# Patient Record
Sex: MALE | Race: WHITE | Employment: FULL TIME | URBAN - METROPOLITAN AREA
[De-identification: names, ages, dates, MRNs, and addresses within clinical notes are randomized per-mention and may not be internally consistent; named-entity substitution may affect disease eponyms.]

---

## 2018-04-28 ENCOUNTER — APPOINTMENT (OUTPATIENT)
Dept: GENERAL RADIOLOGY | Age: 43
End: 2018-04-28
Attending: PHYSICIAN ASSISTANT
Payer: COMMERCIAL

## 2018-04-28 ENCOUNTER — HOSPITAL ENCOUNTER (EMERGENCY)
Age: 43
Discharge: HOME | End: 2018-04-28
Attending: EMERGENCY MEDICINE
Payer: COMMERCIAL

## 2018-04-28 ENCOUNTER — APPOINTMENT (OUTPATIENT)
Dept: CT IMAGING | Age: 43
End: 2018-04-28
Attending: PHYSICIAN ASSISTANT
Payer: COMMERCIAL

## 2018-04-28 VITALS
TEMPERATURE: 98.2 F | RESPIRATION RATE: 20 BRPM | DIASTOLIC BLOOD PRESSURE: 99 MMHG | WEIGHT: 315 LBS | HEIGHT: 74 IN | SYSTOLIC BLOOD PRESSURE: 144 MMHG | BODY MASS INDEX: 40.43 KG/M2 | HEART RATE: 103 BPM

## 2018-04-28 DIAGNOSIS — S16.1XXA STRAIN OF NECK MUSCLE, INITIAL ENCOUNTER: ICD-10-CM

## 2018-04-28 DIAGNOSIS — S61.412D LACERATION OF LEFT HAND WITHOUT FOREIGN BODY, SUBSEQUENT ENCOUNTER: ICD-10-CM

## 2018-04-28 DIAGNOSIS — S00.03XA CONTUSION OF SCALP, INITIAL ENCOUNTER: Primary | ICD-10-CM

## 2018-04-28 DIAGNOSIS — S39.012A LUMBAR STRAIN, INITIAL ENCOUNTER: ICD-10-CM

## 2018-04-28 PROCEDURE — 90471 IMMUNIZATION ADMIN: CPT

## 2018-04-28 PROCEDURE — 74011250637 HC RX REV CODE- 250/637: Performed by: PHYSICIAN ASSISTANT

## 2018-04-28 PROCEDURE — 72070 X-RAY EXAM THORAC SPINE 2VWS: CPT

## 2018-04-28 PROCEDURE — 99283 EMERGENCY DEPT VISIT LOW MDM: CPT

## 2018-04-28 PROCEDURE — 72100 X-RAY EXAM L-S SPINE 2/3 VWS: CPT

## 2018-04-28 PROCEDURE — 74011250637 HC RX REV CODE- 250/637: Performed by: EMERGENCY MEDICINE

## 2018-04-28 PROCEDURE — 74011250636 HC RX REV CODE- 250/636: Performed by: EMERGENCY MEDICINE

## 2018-04-28 PROCEDURE — 70450 CT HEAD/BRAIN W/O DYE: CPT

## 2018-04-28 PROCEDURE — 90715 TDAP VACCINE 7 YRS/> IM: CPT | Performed by: PHYSICIAN ASSISTANT

## 2018-04-28 PROCEDURE — 72125 CT NECK SPINE W/O DYE: CPT

## 2018-04-28 PROCEDURE — 74011250636 HC RX REV CODE- 250/636: Performed by: PHYSICIAN ASSISTANT

## 2018-04-28 RX ORDER — OXYCODONE AND ACETAMINOPHEN 5; 325 MG/1; MG/1
1-2 TABLET ORAL
Qty: 12 TAB | Refills: 0 | Status: SHIPPED | OUTPATIENT
Start: 2018-04-28 | End: 2018-07-06 | Stop reason: ALTCHOICE

## 2018-04-28 RX ORDER — OXYCODONE AND ACETAMINOPHEN 5; 325 MG/1; MG/1
1 TABLET ORAL
Status: COMPLETED | OUTPATIENT
Start: 2018-04-28 | End: 2018-04-28

## 2018-04-28 RX ORDER — MORPHINE SULFATE 4 MG/ML
4 INJECTION INTRAVENOUS
Status: DISCONTINUED | OUTPATIENT
Start: 2018-04-28 | End: 2018-04-29 | Stop reason: HOSPADM

## 2018-04-28 RX ORDER — IBUPROFEN 600 MG/1
600 TABLET ORAL
Qty: 30 TAB | Refills: 0 | Status: SHIPPED | OUTPATIENT
Start: 2018-04-28

## 2018-04-28 RX ORDER — METHOCARBAMOL 500 MG/1
1000 TABLET, FILM COATED ORAL 4 TIMES DAILY
Status: DISCONTINUED | OUTPATIENT
Start: 2018-04-28 | End: 2018-04-29 | Stop reason: HOSPADM

## 2018-04-28 RX ORDER — CEPHALEXIN 250 MG/1
500 CAPSULE ORAL
Status: COMPLETED | OUTPATIENT
Start: 2018-04-28 | End: 2018-04-28

## 2018-04-28 RX ORDER — TADALAFIL 10 MG/1
10 TABLET ORAL
COMMUNITY
End: 2018-07-06 | Stop reason: ALTCHOICE

## 2018-04-28 RX ORDER — MORPHINE SULFATE 2 MG/ML
2 INJECTION, SOLUTION INTRAMUSCULAR; INTRAVENOUS ONCE
Status: DISCONTINUED | OUTPATIENT
Start: 2018-04-28 | End: 2018-04-29 | Stop reason: HOSPADM

## 2018-04-28 RX ADMIN — TETANUS TOXOID, REDUCED DIPHTHERIA TOXOID AND ACELLULAR PERTUSSIS VACCINE, ADSORBED 0.5 ML: 5; 2.5; 8; 8; 2.5 SUSPENSION INTRAMUSCULAR at 20:47

## 2018-04-28 RX ADMIN — CEPHALEXIN 500 MG: 250 CAPSULE ORAL at 20:55

## 2018-04-28 RX ADMIN — OXYCODONE HYDROCHLORIDE AND ACETAMINOPHEN 1 TABLET: 5; 325 TABLET ORAL at 18:59

## 2018-04-28 NOTE — LETTER
NOTIFICATION RETURN TO WORK / SCHOOL 
 
4/28/2018 8:24 PM 
 
Mr. Barbara Garza [address] [place] [id number] To Whom It May Concern: 
 
Barbara Garza is currently under the care of [id number] [place] [address] EMERGENCY DEPT. He will return to work/school on: 5/1/18 If there are questions or concerns please have the patient contact our office. Sincerely, 
 
 
Dr Anh Azevedo

## 2018-04-28 NOTE — ED NOTES
Pt. Is requesting his morphine, Pt was notified what the MD is suggesting about holding the medication at this time. RN notified and will continue to monitor.

## 2018-04-28 NOTE — ED PROVIDER NOTES
EMERGENCY DEPARTMENT HISTORY AND PHYSICAL EXAM    Date: 4/28/2018  Patient Name: Ashley Davis    History of Presenting Illness     Chief Complaint   Patient presents with   Ulysses  Fall    Neck Pain    Back Pain    Laceration         History Provided By: Patient and Patient's Wife    Chief Complaint: Fall, back pain, neck pain   Duration: 1 hour   Timing:  Acute  Location: Head, neck, back, left palm   Quality: Aching  Severity: Moderate  Modifying Factors: Movement makes it worse, nothing makes it better   Associated Symptoms: none       Additional History (Context): Ashley Davis is a 38 y.o. male with a history of ADHD who presents with a 1 hour history of fall off a 6 ft ladder while does yard work outside. He states it was an A ladder and he landed on his back in the center of it. Denies hitting his head or LOC. Denies loss of bowel or bladder. Reports tingling down the left arm. Last tetanus unknown. Pt denies any fevers or chills, headache, dizziness or light headedness, ENT issues, CP or discomfort, SOB, cough, n/v/d/c, abd pain, diaphoresis, melena/hematochezia, dysuria, hematuria, frequency, focal weakness/numbness/tingling, or rash. Patient has no other complaints at this time. PCP: None    Current Facility-Administered Medications   Medication Dose Route Frequency Provider Last Rate Last Dose    oxyCODONE-acetaminophen (PERCOCET) 5-325 mg per tablet 1 Tab  1 Tab Oral NOW [place], [place]         Current Outpatient Prescriptions   Medication Sig Dispense Refill    tadalafil (CIALIS) 10 mg tablet Take 10 mg by mouth.  testosterone cypionate (DEPOTESTOTERONE CYPIONATE) 200 mg/mL injection 1 mL by IntraMUSCular route once for 1 dose. Every 2 weeks 2 mL 0    testosterone cypionate (DEPO-TESTOSTERONE) 200 mg/mL injection 1 mL by IntraMUSCular route once for 1 dose.  Every 2 weeks 10 mL 0    Needle, Disp, 21 G 21 x 1 1/2 \" Ndle For testosterone injection 10 Each 0    Syringe, Disposable, 3 mL Syrg For use with testosterone injection 10 Syringe 0       Past History     Past Medical History:  Past Medical History:   Diagnosis Date    Psychiatric disorder     ADHD    Varicose veins        Past Surgical History:  History reviewed. No pertinent surgical history. Family History:  Family History   Problem Relation Age of Onset    Diabetes Maternal Grandmother     Cancer Other        Social History:  Social History   Substance Use Topics    Smoking status: Never Smoker    Smokeless tobacco: Never Used    Alcohol use No       Allergies:  No Known Allergies      Review of Systems   Review of Systems   Constitutional: Negative for chills and fever. HENT: Negative for congestion, rhinorrhea and sore throat. Respiratory: Negative for cough and shortness of breath. Cardiovascular: Negative for chest pain. Gastrointestinal: Negative for abdominal pain, blood in stool, constipation, diarrhea, nausea and vomiting. Genitourinary: Negative for dysuria, frequency and hematuria. Musculoskeletal: Positive for back pain, myalgias, neck pain and neck stiffness. Skin: Negative for rash and wound. Neurological: Negative for dizziness and headaches. All other systems reviewed and are negative. All Other Systems Negative  Physical Exam     Vitals:    04/28/18 1756   BP: (!) 144/99   Pulse: (!) 103   Resp: 20   Temp: 98.2 °F (36.8 °C)   Weight: 145.2 kg (320 lb)   Height: 6' 2\" (1.88 m)     Physical Exam   Constitutional: He is oriented to person, place, and time. He appears well-developed and well-nourished. No distress. HENT:   Head: Normocephalic and atraumatic. Eyes: Conjunctivae are normal.   Neck: Normal range of motion. Neck supple. Cardiovascular: Normal rate, regular rhythm and normal heart sounds. Pulmonary/Chest: Effort normal and breath sounds normal. No respiratory distress. He exhibits no tenderness. Abdominal: Soft. Bowel sounds are normal. He exhibits no distension.  There is no tenderness. There is no rebound and no guarding. Musculoskeletal: Normal range of motion. He exhibits no edema or deformity. Thoracic back: He exhibits tenderness. He exhibits normal range of motion, no bony tenderness, no swelling, no edema and no deformity. Lumbar back: He exhibits normal range of motion, no tenderness, no bony tenderness, no swelling and no deformity. Neurological: He is alert and oriented to person, place, and time. Skin: Skin is warm and dry. He is not diaphoretic. Psychiatric: He has a normal mood and affect. His behavior is normal.   Nursing note and vitals reviewed. Diagnostic Study Results     Labs -   No results found for this or any previous visit (from the past 12 hour(s)). Radiologic Studies -   CT HEAD WO CONT    (Results Pending)   CT SPINE CERV WO CONT    (Results Pending)     CT Results  (Last 48 hours)    None        CXR Results  (Last 48 hours)    None            Medical Decision Making   I am the first provider for this patient. I reviewed the vital signs, available nursing notes, past medical history, past surgical history, family history and social history. Vital Signs-Reviewed the patient's vital signs. Pulse Oximetry Analysis -  100 % RA    Records Reviewed: Nursing Notes and Old Medical Records    Procedures:   Procedures    Provider Notes (Medical Decision Making):     Differential: fracture, closed head injury, dislocation, laceration, abrasion, contusion     Plan: Will order Ct of head and neck, xray of thorax and lumbar, will irrigate and clean wound. Will update tetanus     7:24 PM : Pt care transferred to Dr. José Manuel Castellano   ,ED provider. History of patient complaint(s), available diagnostic reports and current treatment plan has been discussed thoroughly. Bedside rounding on patient occured : no .   Intended disposition of patient : Home   Pending diagnostics reports and/or labs (please list): Imaging       MED RECONCILIATION:  Current Facility-Administered Medications   Medication Dose Route Frequency    oxyCODONE-acetaminophen (PERCOCET) 5-325 mg per tablet 1 Tab  1 Tab Oral NOW     Current Outpatient Prescriptions   Medication Sig    tadalafil (CIALIS) 10 mg tablet Take 10 mg by mouth.  testosterone cypionate (DEPOTESTOTERONE CYPIONATE) 200 mg/mL injection 1 mL by IntraMUSCular route once for 1 dose. Every 2 weeks    testosterone cypionate (DEPO-TESTOSTERONE) 200 mg/mL injection 1 mL by IntraMUSCular route once for 1 dose. Every 2 weeks    Needle, Disp, 21 G 21 x 1 1/2 \" Ndle For testosterone injection    Syringe, Disposable, 3 mL Syrg For use with testosterone injection       Disposition:  Home     DISCHARGE NOTE:   Pt has been reexamined. Patient has no new complaints, changes, or physical findings. Care plan outlined and precautions discussed. Results of imaging  were reviewed with the patient. All medications were reviewed with the patient; will d/c home with pain meds . All of pt's questions and concerns were addressed. Patient was instructed and agrees to follow up with PCP , as well as to return to the ED upon further deterioration. Patient is ready to go home. Follow-up Information     Follow up With Details Comments [address] Call in 2 days For Follow Up 2019 Chriss Kussmaul  [address] [facility] [address]    [id number] [facility] EMERGENCY DEPT Go to As needed, If symptoms worsen [address] [id number]  [phone]          Discharge Medication List as of 4/28/2018  8:26 PM      CONTINUE these medications which have NOT CHANGED    Details   tadalafil (CIALIS) 10 mg tablet Take 10 mg by mouth., Historical Med      testosterone cypionate (DEPOTESTOTERONE CYPIONATE) 200 mg/mL injection 1 mL by IntraMUSCular route once for 1 dose.  Every 2 weeks, Print, Disp-2 mL, R-0      testosterone cypionate (DEPO-TESTOSTERONE) 200 mg/mL injection 1 mL by IntraMUSCular route once for 1 dose. Every 2 weeks, Print, Disp-10 mL, R-0      Needle, Disp, 21 G 21 x 1 1/2 \" Ndle For testosterone injectionPrint, Disp-10 Each, R-0      Syringe, Disposable, 3 mL Syrg For use with testosterone injectionNormal, Disp-10 Syringe, R-0             Diagnosis     Clinical Impression:   1. Contusion of scalp, initial encounter    2. Strain of neck muscle, initial encounter    3. Laceration of left hand without foreign body, subsequent encounter    4.  Lumbar strain, initial encounter

## 2018-04-28 NOTE — ED NOTES
7:51 PM :Pt care assumed from [facility] , ED provider. Pt complaint(s), current treatment plan, progression and available diagnostic results have been discussed thoroughly. Rounding occurred: yes  Intended Disposition: TBD   Pending diagnostic reports and/or labs (please list): Imaging results       8:22 PM Pt rechecked and has no point tenderness of the spine. Pt only complains of generalized tenderness. 8:23 PM I have assessed the patient and discussed his results and diagnosis. Pt is discharged is in stable condition. Patient will f/u with PCP. Patient is to return to emergency department if any new or worsening condition. Patient understands and verbalizes agreement with plan.

## 2018-04-28 NOTE — ED NOTES
Per Dr. Nathan Aaron, hold Morphine and Robaxin at this time due to already receiving percocet. Patients RN notified and will continue to monitor.

## 2018-04-29 NOTE — DISCHARGE INSTRUCTIONS
Contusion: Care Instructions  Your Care Instructions  Contusion is the medical term for a bruise. It is the result of a direct blow or an impact, such as a fall. Contusions are common sports injuries. Most people think of a bruise as a black-and-blue spot. This happens when small blood vessels get torn and leak blood under the skin. But bones, muscles, and organs can also get bruised. This may damage deep tissues but not cause a bruise you can see. The doctor will do a physical exam to find the location of your contusion. You may also have tests to make sure you do not have a more serious injury, such as a broken bone or nerve damage. These may include X-rays or other imaging tests like a CT scan or MRI. Deep-tissue contusions may cause pain and swelling. But if there is no serious damage, they will often get better in a few weeks with home treatment. The doctor has checked you carefully, but problems can develop later. If you notice any problems or new symptoms, get medical treatment right away. Follow-up care is a key part of your treatment and safety. Be sure to make and go to all appointments, and call your doctor if you are having problems. It's also a good idea to know your test results and keep a list of the medicines you take. How can you care for yourself at home? · Put ice or a cold pack on the sore area for 10 to 20 minutes at a time to stop swelling. Put a thin cloth between the ice pack and your skin. · Be safe with medicines. Read and follow all instructions on the label. ¨ If the doctor gave you a prescription medicine for pain, take it as prescribed. ¨ If you are not taking a prescription pain medicine, ask your doctor if you can take an over-the-counter medicine. · If you can, prop up the sore area on pillows as much as possible for the next few days. Try to keep the sore area above the level of your heart. When should you call for help?   Call your doctor now or seek immediate medical care if:  · Your pain gets worse. · You have new or worse swelling. · You have tingling, weakness, or numbness in the area near the contusion. · The area near the contusion is cold or pale. Watch closely for changes in your health, and be sure to contact your doctor if:  · You do not get better as expected. Where can you learn more? Go to [email or web address]  Enter [id number] in the search box to learn more about \"Contusion: Care Instructions. \"   © 2065-0315 [employer]. Care instructions adapted under license by [employer] (which disclaims liability or warranty for this information). This care instruction is for use with your licensed healthcare professional. If you have questions about a medical condition or this instruction, always ask your healthcare professional. [address] any warranty or liability for your use of this information. Content Version: 37.2.431116; Current as of: May 22, 2015             Neck Strain: Care Instructions  Your Care Instructions    You have strained the muscles and ligaments in your neck. A sudden, awkward movement can strain the neck. This often occurs with falls or car accidents or during certain sports. Everyday activities like working on a computer or sleeping can also cause neck strain if they force you to hold your neck in an awkward position for a long time. It is common for neck pain to get worse for a day or two after an injury, but it should start to feel better after that. You may have more pain and stiffness for several days before it gets better. This is expected. It may take a few weeks or longer for it to heal completely. Good home treatment can help you get better faster and avoid future neck problems. Follow-up care is a key part of your treatment and safety. Be sure to make and go to all appointments, and call your doctor if you are having problems.  It's also a good idea to know your test results and keep a list of the medicines you take. How can you care for yourself at home? · If you were given a neck brace (cervical collar) to limit neck motion, wear it as instructed for as many days as your doctor tells you to. Do not wear it longer than you were told to. Wearing a brace for too long can make neck stiffness worse and weaken the neck muscles. · You can try using heat or ice to see if it helps. ¨ Try using a heating pad on a low or medium setting for 15 to 20 minutes every 2 to 3 hours. Try a warm shower in place of one session with the heating pad. You can also buy single-use heat wraps that last up to 8 hours. ¨ You can also try an ice pack for 10 to 15 minutes every 2 to 3 hours. · Take pain medicines exactly as directed. ¨ If the doctor gave you a prescription medicine for pain, take it as prescribed. ¨ If you are not taking a prescription pain medicine, ask your doctor if you can take an over-the-counter medicine. · Gently rub the area to relieve pain and help with blood flow. Do not massage the area if it hurts to do so. · Do not do anything that makes the pain worse. Take it easy for a couple of days. You can do your usual activities if they do not hurt your neck or put it at risk for more stress or injury. · Try sleeping on a special neck pillow. Place it under your neck, not under your head. Placing a tightly rolled-up towel under your neck while you sleep will also work. If you use a neck pillow or rolled towel, do not use your regular pillow at the same time. · To prevent future neck pain, do exercises to stretch and strengthen your neck and back. Learn how to use good posture, safe lifting techniques, and proper body mechanics. When should you call for help? Call 911 anytime you think you may need emergency care. For example, call if:  ? · You are unable to move an arm or a leg at all.    ?Call your doctor now or seek immediate medical care if:  ? · You have new or worse symptoms in your arms, legs, chest, belly, or buttocks. Symptoms may include:  ¨ Numbness or tingling. ¨ Weakness. ¨ Pain. ? · You lose bladder or bowel control. ? Watch closely for changes in your health, and be sure to contact your doctor if:  ? · You are not getting better as expected. Where can you learn more? Go to http://mariam-kaitlynn.info/. Enter M253 in the search box to learn more about \"Neck Strain: Care Instructions. \"  Current as of: March 21, 2017  Content Version: 11.4  © 8710-8632 [employer]. Care instructions adapted under license by [employer] (which disclaims liability or warranty for this information). If you have questions about a medical condition or this instruction, always ask your healthcare professional. [address] any warranty or liability for your use of this information. Cuts: Care Instructions  Your Care Instructions  A cut can happen anywhere on your body. Stitches, staples, skin adhesives, or pieces of tape called Steri-Strips are sometimes used to keep the edges of a cut together and help it heal. Steri-Strips can be used by themselves or with stitches or staples. Sometimes cuts are left open. If the cut went deep and through the skin, the doctor may have closed the cut in two layers. A deeper layer of stitches brings the deep part of the cut together. These stitches will dissolve and don't need to be removed. The upper layer closure, which could be stitches, staples, Steri-Strips, or adhesive, is what you see on the cut. A cut is often covered by a bandage. The doctor has checked you carefully, but problems can develop later. If you notice any problems or new symptoms, get medical treatment right away. Follow-up care is a key part of your treatment and safety. Be sure to make and go to all appointments, and call your doctor if you are having problems.  It's also a good idea to know your test results and keep a list of the medicines you take. How can you care for yourself at home? If a cut is open or closed  · Prop up the sore area on a pillow anytime you sit or lie down during the next 3 days. Try to keep it above the level of your heart. This will help reduce swelling. · Keep the cut dry for the first 24 to 48 hours. After this, you can shower if your doctor okays it. Pat the cut dry. · Don't soak the cut, such as in a bathtub. Your doctor will tell you when it's safe to get the cut wet. · After the first 24 to 48 hours, clean the cut with soap and water 2 times a day unless your doctor gives you different instructions. ¨ Don't use hydrogen peroxide or alcohol, which can slow healing. ¨ You may cover the cut with a thin layer of petroleum jelly and a nonstick bandage. ¨ If the doctor put a bandage over the cut, put on a new bandage after cleaning the cut or if the bandage gets wet or dirty. · Avoid any activity that could cause your cut to reopen. · Be safe with medicines. Read and follow all instructions on the label. ¨ If the doctor gave you a prescription medicine for pain, take it as prescribed. ¨ If you are not taking a prescription pain medicine, ask your doctor if you can take an over-the-counter medicine. If the cut is closed with stitches, staples, or Steri-Strips  · Follow the above instructions for open or closed cuts. · Do not remove the stitches or staples on your own. Your doctor will tell you when to come back to have the stitches or staples removed. · Leave Steri-Strips on until they fall off. If the cut is closed with a skin adhesive  · Follow the above instructions for open or closed cuts. · Leave the skin adhesive on your skin until it falls off on its own. This may take 5 to 10 days. · Do not scratch, rub, or pick at the adhesive. · Do not put the sticky part of a bandage directly on the adhesive. · Do not put any kind of ointment, cream, or lotion over the area.  This can make the adhesive fall off too soon. Do not use hydrogen peroxide or alcohol, which can slow healing. When should you call for help? Call your doctor now or seek immediate medical care if:  ? · You have new pain, or your pain gets worse. ? · The skin near the cut is cold or pale or changes color. ? · You have tingling, weakness, or numbness near the cut.   ? · The cut starts to bleed, and blood soaks through the bandage. Oozing small amounts of blood is normal.   ? · You have trouble moving the area near the cut.   ? · You have symptoms of infection, such as:  ¨ Increased pain, swelling, warmth, or redness around the cut. ¨ Red streaks leading from the cut. ¨ Pus draining from the cut. ¨ A fever. ? Watch closely for changes in your health, and be sure to contact your doctor if:  ? · The cut reopens. ? · You do not get better as expected. Where can you learn more? Go to http://mariam-kaitlynn.info/. Enter M735 in the search box to learn more about \"Cuts: Care Instructions. \"  Current as of: March 20, 2017  Content Version: 11.4  © 1405-2636 [employer]. Care instructions adapted under license by [employer] (which disclaims liability or warranty for this information). If you have questions about a medical condition or this instruction, always ask your healthcare professional. [place] [address] any warranty or liability for your use of this information. Back Strain: Care Instructions  Your Care Instructions    Back strain happens when you overstretch, or pull, a muscle in your back. You may hurt your back in an accident or when you exercise or lift something. Most back pain will get better with rest and time. You can take care of yourself at home to help your back heal.  Follow-up care is a key part of your treatment and safety. Be sure to make and go to all appointments, and call your doctor if you are having problems.  It's also a good idea to know your test results and keep a list of the medicines you take. How can you care for yourself at home? · Try to stay as active as you can, but stop or reduce any activity that causes pain. · Put ice or a cold pack on the sore muscle for 10 to 20 minutes at a time to stop swelling. Try this every 1 to 2 hours for 3 days (when you are awake) or until the swelling goes down. Put a thin cloth between the ice pack and your skin. · After 2 or 3 days, apply a heating pad on low or a warm cloth to your back. Some doctors suggest that you go back and forth between hot and cold treatments. · Take pain medicines exactly as directed. ¨ If the doctor gave you a prescription medicine for pain, take it as prescribed. ¨ If you are not taking a prescription pain medicine, ask your doctor if you can take an over-the-counter medicine. · Try sleeping on your side with a pillow between your legs. Or put a pillow under your knees when you lie on your back. These measures can ease pain in your lower back. · Return to your usual level of activity slowly. When should you call for help? Call 911 anytime you think you may need emergency care. For example, call if:  ? · You are unable to move a leg at all. ?Call your doctor now or seek immediate medical care if:  ? · You have new or worse symptoms in your legs, belly, or buttocks. Symptoms may include:  ¨ Numbness or tingling. ¨ Weakness. ¨ Pain. ? · You lose bladder or bowel control. ? Watch closely for changes in your health, and be sure to contact your doctor if you are not getting better as expected. Where can you learn more? Go to http://mariam-kaitlynn.info/. Enter R603 in the search box to learn more about \"Back Strain: Care Instructions. \"  Current as of: March 21, 2017  Content Version: 11.4  © 5639-5035 [employer].  Care instructions adapted under license by [employer] (which disclaims liability or warranty for this information). If you have questions about a medical condition or this instruction, always ask your healthcare professional. [place] [address] any warranty or liability for your use of this information.

## 2018-04-29 NOTE — ED NOTES
Written and verbal discharge instructions given. Patient verbalizes understanding of same. Patient denies  further questions about treatment and discharge instructions. Left ED with patent airway and steady gait. Arm band removed shredded.  Patient left ED with 2 RX

## 2018-05-29 ENCOUNTER — OFFICE VISIT (OUTPATIENT)
Dept: VASCULAR SURGERY | Age: 43
End: 2018-05-29

## 2018-05-29 VITALS
HEART RATE: 86 BPM | SYSTOLIC BLOOD PRESSURE: 120 MMHG | BODY MASS INDEX: 40.43 KG/M2 | DIASTOLIC BLOOD PRESSURE: 76 MMHG | HEIGHT: 74 IN | WEIGHT: 315 LBS

## 2018-05-29 DIAGNOSIS — I83.029 VENOUS ULCER OF BOTH LOWER EXTREMITIES WITH VARICOSE VEINS (HCC): ICD-10-CM

## 2018-05-29 DIAGNOSIS — L97.929 VENOUS ULCER OF BOTH LOWER EXTREMITIES WITH VARICOSE VEINS (HCC): ICD-10-CM

## 2018-05-29 DIAGNOSIS — I83.019 VENOUS ULCER OF BOTH LOWER EXTREMITIES WITH VARICOSE VEINS (HCC): ICD-10-CM

## 2018-05-29 DIAGNOSIS — L97.919 VENOUS ULCER OF BOTH LOWER EXTREMITIES WITH VARICOSE VEINS (HCC): ICD-10-CM

## 2018-05-29 DIAGNOSIS — I83.893 VARICOSE VEINS OF BOTH LOWER EXTREMITIES WITH COMPLICATIONS: Primary | ICD-10-CM

## 2018-05-29 PROBLEM — E66.01 OBESITY, MORBID (HCC): Status: ACTIVE | Noted: 2018-05-29

## 2018-05-29 RX ORDER — ASPIRIN 325 MG
325 TABLET ORAL DAILY
COMMUNITY

## 2018-05-29 RX ORDER — DEXTROAMPHETAMINE SACCHARATE, AMPHETAMINE ASPARTATE, DEXTROAMPHETAMINE SULFATE AND AMPHETAMINE SULFATE 5; 5; 5; 5 MG/1; MG/1; MG/1; MG/1
20 TABLET ORAL 3 TIMES DAILY
COMMUNITY

## 2018-05-29 NOTE — PROGRESS NOTES
Barbie Arora    Chief Complaint   Patient presents with    Leg Pain       History and Physical    Mr Brent Hearn is here at the request of his PCP  He has long standing history of venous insufficiency/reflux  He was in to see his PCP recently in follow up to ED visit after a fall off of a ladder  He is doing ok  But it was recognized about his leg edema, varicose veins, and ankle ulcers, that he was again persuaded to get re-established with vascular  He acknowledges problems since adolescence  He has had prior GSV ablations in 2012 at another facility  He is very compliant wearing knee high 20-30mmHg compression for years  The ladder fall did not cause any ulcer as these existed previously  He has a pin point opening on the left medial ankle and about 2-3mm opening right medial ankle and can see scarring from prior ulcers  He is doing no specific care other then keeping clean and dry    He is a pharmD and works at [employer]  He denies work up previously for any central vein or may thurner condition. But recalls some findings on leg ultrasound that upon discussion it may have perhaps been some deep vein reflux? Past Medical History:   Diagnosis Date    Psychiatric disorder     ADHD    Varicose veins      Patient Active Problem List   Diagnosis Code    Hypogonadism male E29.1    Erectile dysfunction N52.9    Obesity, morbid ([facility] [address] E66.01     History reviewed. No pertinent surgical history. Current Outpatient Prescriptions   Medication Sig Dispense Refill    dextroamphetamine-amphetamine (ADDERALL) 20 mg tablet Take 20 mg by mouth three (3) times daily.  aspirin (ASPIRIN) 325 mg tablet Take 325 mg by mouth daily.  ibuprofen (MOTRIN) 600 mg tablet Take 1 Tab by mouth every six (6) hours as needed for Pain.  30 Tab 0    Needle, Disp, 21 G 21 x 1 1/2 \" Ndle For testosterone injection 10 Each 0    Syringe, Disposable, 3 mL Syrg For use with testosterone injection 10 Syringe 0    tadalafil (CIALIS) 10 mg tablet Take 10 mg by mouth.  oxyCODONE-acetaminophen (PERCOCET) 5-325 mg per tablet Take 1-2 Tabs by mouth every six (6) hours as needed for Pain. Max Daily Amount: 8 Tabs. 12 Tab 0    testosterone cypionate (DEPOTESTOTERONE CYPIONATE) 200 mg/mL injection 1 mL by IntraMUSCular route once for 1 dose. Every 2 weeks 2 mL 0    testosterone cypionate (DEPO-TESTOSTERONE) 200 mg/mL injection 1 mL by IntraMUSCular route once for 1 dose. Every 2 weeks 10 mL 0     No Known Allergies  Social History     Social History    Marital status:      Spouse name: N/A    Number of children: N/A    Years of education: N/A     Occupational History    Not on file. Social History Main Topics    Smoking status: Never Smoker    Smokeless tobacco: Never Used    Alcohol use No    Drug use: No    Sexual activity: Yes     Other Topics Concern    Not on file     Social History Narrative      Family History   Problem Relation Age of Onset    Diabetes Maternal Grandmother     Cancer Other        Review of Systems    10 point ROS negative unless stated in HPI above    Physical Exam:    Visit Vitals    /76 (BP 1 Location: Left arm, BP Patient Position: Sitting)    Pulse 86    Ht 6' 2\" (1.88 m)    Wt 320 lb (145.2 kg)    BMI 41.09 kg/m2      General:  Alert, cooperative, no distress. Head:  Normocephalic, without obvious abnormality, atraumatic. Eyes:    Conjunctivae/corneas clear. Pupils equal, round, reactive to light. Extraocular movements intact. Extremities: Patient explains varicose veins in upper thighs. No remarkable or prominent veins distally. Trace edema   Pulses: Easily palpable with no signs of arterial insufficiency   Skin: As described in HPI above regarding the medial ankles           Impression and Plan:  1. Varicose veins of both lower extremities with complications    2.  Venous ulcer of both lower extremities with varicose veins ([address]      Orders Placed This Encounter    DUPLEX LOWER EXT VENOUS BILAT AMB (Reflux)    dextroamphetamine-amphetamine (ADDERALL) 20 mg tablet    aspirin (ASPIRIN) 325 mg tablet     Patient is well versed in the venous disease given his long standing history and his medical background  With development of new varicose veins since original ablation procedures, I suggested new evaluation with reflux study  It may go beyond leg evaluation and a central venogram considered, which I did explain  He is agreeable to complete the reflux study and with the other considerations I will then have him follow up with dr Yesi Oden after the study     Will ERIKA Higuera    Portions of this note have been created using voice recognition software.

## 2018-06-20 ENCOUNTER — APPOINTMENT (OUTPATIENT)
Dept: NON INVASIVE DIAGNOSTICS | Age: 43
End: 2018-06-20
Attending: PHYSICIAN ASSISTANT
Payer: COMMERCIAL

## 2018-06-20 ENCOUNTER — HOSPITAL ENCOUNTER (OUTPATIENT)
Dept: NON INVASIVE DIAGNOSTICS | Age: 43
Discharge: HOME | End: 2018-06-20
Attending: PHYSICIAN ASSISTANT
Payer: COMMERCIAL

## 2018-06-20 VITALS — HEIGHT: 74 IN | WEIGHT: 315 LBS | BODY MASS INDEX: 40.43 KG/M2

## 2018-06-20 DIAGNOSIS — R94.31 ABNORMAL ELECTROCARDIOGRAM: ICD-10-CM

## 2018-06-20 LAB
NUC REST EJECTION FRACTION: 45 %
STRESS BASELINE HR: 60 BPM
STRESS BASELINE SYS BP: NORMAL MMHG
STRESS ESTIMATED WORKLOAD: 1.6 METS
STRESS EXERCISE DUR MIN: NORMAL
STRESS PEAK DIAS BP: 80 MMHG
STRESS PEAK SYS BP: 130 MMHG
STRESS POST PEAK HR: 117 BPM
STRESS ST DEPRESSION: 0 MM
STRESS ST ELEVATION: 0 MM
STRESS TARGET HR: 152 BPM
TID: 1.12

## 2018-06-20 PROCEDURE — A9500 TC99M SESTAMIBI: HCPCS

## 2018-06-20 PROCEDURE — 74011250636 HC RX REV CODE- 250/636

## 2018-06-20 RX ORDER — SODIUM CHLORIDE 9 MG/ML
250 INJECTION, SOLUTION INTRAVENOUS ONCE
Status: DISPENSED | OUTPATIENT
Start: 2018-06-20 | End: 2018-06-20

## 2018-06-20 RX ADMIN — REGADENOSON 0.4 MG: 0.08 INJECTION, SOLUTION INTRAVENOUS at 09:10

## 2018-06-20 NOTE — PROGRESS NOTES
Patient was given 10.3 mCi of Sestamibi for the Resting pictures. Patient received 0.4 mg of Lexiscan for the exercise portion of the Stress test. Patient was then given 33 mCi of Sestamibi for the Stress pictures. Armband was removed and disposed of before the patient left.

## 2018-07-06 ENCOUNTER — OFFICE VISIT (OUTPATIENT)
Dept: CARDIOLOGY CLINIC | Age: 43
End: 2018-07-06

## 2018-07-06 VITALS
DIASTOLIC BLOOD PRESSURE: 90 MMHG | BODY MASS INDEX: 39.27 KG/M2 | WEIGHT: 306 LBS | HEART RATE: 75 BPM | OXYGEN SATURATION: 98 % | SYSTOLIC BLOOD PRESSURE: 130 MMHG | HEIGHT: 74 IN

## 2018-07-06 DIAGNOSIS — I87.2 VENOUS INSUFFICIENCY OF BOTH LOWER EXTREMITIES: ICD-10-CM

## 2018-07-06 DIAGNOSIS — R93.1 ABNORMAL NUCLEAR CARDIAC IMAGING TEST: Primary | ICD-10-CM

## 2018-07-06 DIAGNOSIS — E66.9 OBESITY (BMI 35.0-39.9 WITHOUT COMORBIDITY): ICD-10-CM

## 2018-07-06 DIAGNOSIS — R94.31 ABNORMAL EKG: ICD-10-CM

## 2018-07-06 RX ORDER — DEXTROAMPHETAMINE SACCHARATE, AMPHETAMINE ASPARTATE MONOHYDRATE, DEXTROAMPHETAMINE SULFATE AND AMPHETAMINE SULFATE 7.5; 7.5; 7.5; 7.5 MG/1; MG/1; MG/1; MG/1
30 CAPSULE, EXTENDED RELEASE ORAL
COMMUNITY

## 2018-07-06 RX ORDER — SILDENAFIL 25 MG/1
25 TABLET, FILM COATED ORAL SEE ADMIN INSTRUCTIONS
COMMUNITY

## 2018-07-06 NOTE — PROGRESS NOTES
1. Have you been to the ER, urgent care clinic since your last visit? Hospitalized since your last visit? No     2. Have you seen or consulted any other health care providers outside of the [address] [place] [address] since your last visit? Include any pap smears or colon screening.  No

## 2018-07-06 NOTE — MR AVS SNAPSHOT
[address] [place] [id number] [phone] Patient: Carloz Dan MRN: [id number] MVI:[id number] Visit Information Date & Time Provider Department Dept. Phone Encounter #  
 7/6/2018  1:00 PM Basil Pruitt MD [facility] [id number] Your Appointments 7/16/2018  1:30 PM  
Follow Up with Vinicius Zendejas MD  
[address] [place] [address] and [facility] Appt Note: FOLLOW UP AFTER STUDIES  
 [address] [place] [address] 939 [address] [facility] [address]  
[phone] 2300 [facility] 200 [facility] [address] Upcoming Health Maintenance Date Due Influenza Age 5 to Adult 8/1/2018 DTaP/Tdap/Td series (2 - Td) 4/28/2028 Allergies as of 7/6/2018  Review Complete On: 5/29/2018 By: Christina Ortega LPN No Known Allergies Current Immunizations  Never Reviewed Name Date Tdap 4/28/2018  8:47 PM  
  
 Not reviewed this visit You Were Diagnosed With   
  
 Codes Comments Abnormal nuclear cardiac imaging test    -  Primary ICD-10-CM: R93.1 ICD-9-CM: 794.39 Abnormal EKG     ICD-10-CM: R94.31 
ICD-9-CM: 794.31 Vitals BP Pulse Height(growth percentile) Weight(growth percentile) SpO2 BMI  
 130/90 75 6' 2\" (1.88 m) 306 lb (138.8 kg) 98% 39.29 kg/m2 Smoking Status Never Smoker Vitals History BMI and BSA Data Body Mass Index Body Surface Area  
 39.29 kg/m 2 2.69 m 2 Preferred Pharmacy Pharmacy Name Phone [facility]   [phone] Your Updated Medication List  
  
   
This list is accurate as of 7/6/18  1:49 PM.  Always use your most recent med list.  
  
  
  
  
 * ADDERALL 20 mg tablet Generic drug:  dextroamphetamine-amphetamine Take 20 mg by mouth three (3) times daily. * ADDERALL XR 30 mg XR capsule Generic drug:  amphetamine-dextroamphetamine XR Take 30 mg by mouth every morning. aspirin 325 mg tablet Commonly known as:  ASPIRIN Take 325 mg by mouth daily. ibuprofen 600 mg tablet Commonly known as:  MOTRIN Take 1 Tab by mouth every six (6) hours as needed for Pain. * testosterone cypionate 200 mg/mL injection Commonly known as:  DEPO-TESTOSTERONE  
1 mL by IntraMUSCular route once for 1 dose. Every 2 weeks * testosterone cypionate 200 mg/mL injection Commonly known as:  DEPOTESTOTERONE CYPIONATE 1 mL by IntraMUSCular route once for 1 dose. Every 2 weeks VIAGRA 25 mg tablet Generic drug:  sildenafil citrate Take 25 mg by mouth as needed. * Notice: This list has 4 medication(s) that are the same as other medications prescribed for you. Read the directions carefully, and ask your doctor or other care provider to review them with you. We Performed the Following AMB POC EKG ROUTINE W/ 12 LEADS, INTER & REP [95064 CPT(R)] To-Do List   
 07/06/2018 Echocardiography:  ECHO ADULT COMPLETE Introducing [facility]! [facility] introduces [employer] patient portal. Now you can access parts of your medical record, email your doctor's office, and request medication refills online. 1. In your internet browser, go to [email or web address] 2. Click on the First Time User? Click Here link in the Sign In box. You will see the New Member Sign Up page. 3. Enter your [employer] Access Code exactly as it appears below. You will not need to use this code after youve completed the sign-up process. If you do not sign up before the expiration date, you must request a new code. · [employer] Access Code: [email or web address] Expires: 7/27/2018  5:59 PM 
 
4.  Enter the last four digits of your Social Security Number (xxxx) and Date of Birth (mm/dd/yyyy) as indicated and click Submit. You will be taken to the next sign-up page. 5. Create a LinkedIn ID. This will be your LinkedIn login ID and cannot be changed, so think of one that is secure and easy to remember. 6. Create a LinkedIn password. You can change your password at any time. 7. Enter your Password Reset Question and Answer. This can be used at a later time if you forget your password. 8. Enter your e-mail address. You will receive e-mail notification when new information is available in [address] 9. Click Sign Up. You can now view and download portions of your medical record. 10. Click the Download Summary menu link to download a portable copy of your medical information. If you have questions, please visit the Frequently Asked Questions section of the LinkedIn website. Remember, [employer] is NOT to be used for urgent needs. For medical emergencies, dial 911. Now available from your iPhone and Android! Please provide this summary of care documentation to your next provider. Your primary care clinician is listed as Saundra Mccartney. If you have any questions after today's visit, please call [phone].

## 2018-07-06 NOTE — PROGRESS NOTES
HISTORY OF PRESENT ILLNESS  Claudetta Nest is a 41 y.o. male. HPI    Patient presents for a new office visit. He was referred by his PCP for evaluation of abnormal EKG and an abnormal pharmacologic nuclear stress test.  He does not have a prior cardiac history. He does have history of significant venous reflux disease in both legs status post multiple ablation procedures in the past.  He was found to have poor R-wave progression and a prior EKG with borderline inferior Q waves and as result underwent a pharmacologic nuclear stress test in June 2018 which showed fairly normal perfusion without evidence of ischemia or infarction, however, his ejection fraction was mildly depressed with EF calculated at 45%. His left ventricle also appeared mildly dilated. Patient denies ever having any chest pain, shortness of breath, orthopnea or PND. He does have chronic leg swelling which is unchanged. He has not had any major change in his activity level over the past 6 months. Past Medical History:   Diagnosis Date    Psychiatric disorder     ADHD    Varicose veins      Current Outpatient Prescriptions   Medication Sig Dispense Refill    amphetamine-dextroamphetamine XR (ADDERALL XR) 30 mg XR capsule Take 30 mg by mouth every morning.  sildenafil citrate (VIAGRA) 25 mg tablet Take 25 mg by mouth as needed.  dextroamphetamine-amphetamine (ADDERALL) 20 mg tablet Take 20 mg by mouth three (3) times daily.  aspirin (ASPIRIN) 325 mg tablet Take 325 mg by mouth daily.  ibuprofen (MOTRIN) 600 mg tablet Take 1 Tab by mouth every six (6) hours as needed for Pain. 30 Tab 0    testosterone cypionate (DEPOTESTOTERONE CYPIONATE) 200 mg/mL injection 1 mL by IntraMUSCular route once for 1 dose. Every 2 weeks 2 mL 0    testosterone cypionate (DEPO-TESTOSTERONE) 200 mg/mL injection 1 mL by IntraMUSCular route once for 1 dose.  Every 2 weeks 10 mL 0     No Known Allergies     Social History   Substance Use Topics    Smoking status: Never Smoker    Smokeless tobacco: Never Used    Alcohol use No     Family History   Problem Relation Age of Onset    Diabetes Maternal Grandmother     Cancer Other          Review of Systems   Constitutional: Negative for chills, fever and weight loss. HENT: Negative for nosebleeds. Eyes: Negative for blurred vision and double vision. Respiratory: Negative for cough, shortness of breath and wheezing. Cardiovascular: Positive for leg swelling. Negative for chest pain, palpitations, orthopnea, claudication and PND. Gastrointestinal: Negative for abdominal pain, heartburn, nausea and vomiting. Genitourinary: Negative for dysuria and hematuria. Musculoskeletal: Negative for falls and myalgias. Skin: Negative for rash. Neurological: Negative for dizziness, focal weakness and headaches. Endo/Heme/Allergies: Does not bruise/bleed easily. Psychiatric/Behavioral: Negative for substance abuse. Visit Vitals    /90    Pulse 75    Ht 6' 2\" (1.88 m)    Wt 138.8 kg (306 lb)    SpO2 98%    BMI 39.29 kg/m2       Physical Exam   Constitutional: He is oriented to person, place, and time. He appears well-developed and well-nourished. HENT:   Head: Normocephalic and atraumatic. Eyes: Conjunctivae are normal.   Neck: Neck supple. No JVD present. Carotid bruit is not present. Cardiovascular: Normal rate, regular rhythm, S1 normal, S2 normal and normal pulses. Exam reveals distant heart sounds. Exam reveals no gallop and no S3. No murmur heard. Pulmonary/Chest: Breath sounds normal. He has no wheezes. He has no rales. Abdominal: Soft. Bowel sounds are normal. There is no tenderness. Musculoskeletal: He exhibits no edema, tenderness or deformity. Bilateral varicose veins lower extremities   Neurological: He is alert and oriented to person, place, and time. Skin: Skin is warm and dry.      EKG: Normal sinus rhythm, normal axis, poor R-wave progression, cannot exclude prior anterior infarct, low voltage in the precordial leads, no ST-T wave changes concerning for ischemia. No previous EKG for comparison. ASSESSMENT and PLAN  Encounter Diagnoses   Name Primary?  Abnormal nuclear cardiac imaging test Yes    Abnormal EKG     Obesity (BMI 35.0-39.9 without comorbidity)     Venous insufficiency of both lower extremities      Abnormal nuclear stress test.  Patient stress test was abnormal primarily due to a mildly depressed LV systolic function, EF 09%. However with his body habitus, this can also be due to an inaccurate calculation due to poor delineation of the myocardial borders. He did not have any obvious perfusion abnormalities concerning for ischemia or prior infarct. I have recommended an echocardiogram to more accurately define his LV function. Abnormal EKG. Patient does have poor R-wave progression on his 12-lead electrocardiogram.  This is often seen with his body habitus especially with low voltage. An echocardiogram will be arranged as described above. Obesity. Patient reports that his weight has been fairly stable over the past 10 years. He is encouraged to try lose weight with lifestyle modification. As long as his echocardiogram is unremarkable, patient can follow-up as needed.

## 2018-07-16 ENCOUNTER — OFFICE VISIT (OUTPATIENT)
Dept: VASCULAR SURGERY | Age: 43
End: 2018-07-16

## 2018-07-16 VITALS
WEIGHT: 306 LBS | BODY MASS INDEX: 39.27 KG/M2 | HEART RATE: 72 BPM | RESPIRATION RATE: 16 BRPM | DIASTOLIC BLOOD PRESSURE: 74 MMHG | SYSTOLIC BLOOD PRESSURE: 110 MMHG | HEIGHT: 74 IN

## 2018-07-16 DIAGNOSIS — L97.311 VENOUS STASIS ULCER OF RIGHT ANKLE LIMITED TO BREAKDOWN OF SKIN WITH VARICOSE VEINS (HCC): ICD-10-CM

## 2018-07-16 DIAGNOSIS — I87.303 CHRONIC VENOUS HYPERTENSION INVOLVING BOTH SIDES: ICD-10-CM

## 2018-07-16 DIAGNOSIS — I83.013 VENOUS STASIS ULCER OF RIGHT ANKLE LIMITED TO BREAKDOWN OF SKIN WITH VARICOSE VEINS (HCC): ICD-10-CM

## 2018-07-16 DIAGNOSIS — I87.2 VENOUS INSUFFICIENCY OF BOTH LOWER EXTREMITIES: Primary | ICD-10-CM

## 2018-07-16 RX ORDER — SILDENAFIL CITRATE 20 MG/1
20 TABLET ORAL 3 TIMES DAILY
COMMUNITY

## 2018-07-16 NOTE — PROGRESS NOTES
William Yoder    Chief Complaint   Patient presents with   [facility] [place] Varicose Veins     Follow up studies       History and Physical    William Yoder is a 41 y.o. male with known class VI chronic venous insufficiency of the right lower extremity in class V on the left. Patient continues to have venous claudication of bilateral lower extremities as well as edema. He has pain along his varicose vein sites. He does wear his compression socks appropriately and adheres to medical therapy quite well. Unfortunately this is starting to get to a point where he is having a lot of difficulty performing his job at work. And has trouble with standing for long periods of time as well as even sitting for long periods of time. Is getting more more difficult for him to perform his duties at his work. No fevers or chills or shortness of breath at this current time. His wound on the right medial ankle seems to be slowly improving. Past Medical History:   Diagnosis Date    Psychiatric disorder     ADHD    Varicose veins      History reviewed. No pertinent surgical history. Patient Active Problem List   Diagnosis Code    Hypogonadism male E29.1    Erectile dysfunction N52.9    Obesity, morbid ([facility] [address] E66.01    Venous insufficiency of both lower extremities I87.2    Chronic venous hypertension involving both sides I87.303    Venous stasis ulcer of right ankle limited to breakdown of skin with varicose veins ([facility]) I83.013, L97.311     Current Outpatient Prescriptions   Medication Sig Dispense Refill    sildenafil, antihypertensive, (REVATIO) 20 mg tablet Take 20 mg by mouth three (3) times daily.  amphetamine-dextroamphetamine XR (ADDERALL XR) 30 mg XR capsule Take 30 mg by mouth every morning.  dextroamphetamine-amphetamine (ADDERALL) 20 mg tablet Take 20 mg by mouth three (3) times daily.  aspirin (ASPIRIN) 325 mg tablet Take 325 mg by mouth daily.       ibuprofen (MOTRIN) 600 mg tablet Take 1 Tab by mouth every six (6) hours as needed for Pain. 30 Tab 0    sildenafil citrate (VIAGRA) 25 mg tablet Take 25 mg by mouth See Admin Instructions.  testosterone cypionate (DEPOTESTOTERONE CYPIONATE) 200 mg/mL injection 1 mL by IntraMUSCular route once for 1 dose. Every 2 weeks 2 mL 0    testosterone cypionate (DEPO-TESTOSTERONE) 200 mg/mL injection 1 mL by IntraMUSCular route once for 1 dose. Every 2 weeks 10 mL 0     No Known Allergies  Social History     Social History    Marital status:      Spouse name: N/A    Number of children: N/A    Years of education: N/A     Occupational History    Not on file. Social History Main Topics    Smoking status: Never Smoker    Smokeless tobacco: Never Used    Alcohol use No    Drug use: No    Sexual activity: Yes     Other Topics Concern    Not on file     Social History Narrative      Family History   Problem Relation Age of Onset    Diabetes Maternal Grandmother     Cancer Other        Physical Exam:    Visit Vitals    /74 (BP 1 Location: Left arm, BP Patient Position: Sitting)    Pulse 72    Resp 16    Ht 6' 2\" (1.88 m)    Wt 306 lb (138.8 kg)    BMI 39.29 kg/m2      General: Well-appearing male in no acute distress  HEENT: EOMI no scleral icterus is noted  Pulmonary: No increased work of breathing is noted  Extremities: Warm and perfused bilaterally patient does have large varicosities of bilateral lower extremities. He also on the right medial malleolus has a small breakdown of skin ulceration pinpoint. He also has numerous healed ulcerations along the left medial malleolus as well as hemosiderin and lipodermatosclerosis of bilateral ankles. Neuro: Cranial nerves II through XII grossly intact    Impression and Plan:  Noemi Acuna is a 41 y.o. male with class VI chronic venous insufficiency of the right lower extremity with active ulceration and class V on the left lower extremity given healed ulceration.   I have recommended moving forward with central venography with IVUS imaging. Especially given the fact that his ultrasound imaging shows lateral thigh varicose veins. Depending on what the central venography performs and what we may need to fix and repair will depend on further mechanical phlebectomy or sclerotherapy that would be required of his lower extremities. Everything will be highly dependent as to how well he does after this central venogram and how he feels. We reviewed the plan with the patient and the patient understands. We also gave the patient appropriate instructions on their disease process and when to call back. Follow-up Disposition: Not on [address] [place] [address], MD    PLEASE NOTE:  This document has been produced using voice recognition software. Unrecognized errors in transcription may be present.

## 2018-07-16 NOTE — PROGRESS NOTES
1. Have you been to an emergency room or urgent care clinic since your last visit?  no    Hospitalized since your last visit? If yes, where, when, and reason for visit? no  2. Have you seen or consulted any other health care providers outside of the [facility] since your last visit including any procedures, health maintenance items.  If yes, where, when and reason for visit? no

## 2018-07-17 ENCOUNTER — TELEPHONE (OUTPATIENT)
Dept: VASCULAR SURGERY | Age: 43
End: 2018-07-17

## 2018-07-17 DIAGNOSIS — L97.311 VENOUS STASIS ULCER OF RIGHT ANKLE LIMITED TO BREAKDOWN OF SKIN WITH VARICOSE VEINS (HCC): ICD-10-CM

## 2018-07-17 DIAGNOSIS — I83.013 VENOUS STASIS ULCER OF RIGHT ANKLE LIMITED TO BREAKDOWN OF SKIN WITH VARICOSE VEINS (HCC): ICD-10-CM

## 2018-07-17 DIAGNOSIS — I87.2 VENOUS INSUFFICIENCY OF BOTH LOWER EXTREMITIES: Primary | ICD-10-CM

## 2018-07-17 NOTE — TELEPHONE ENCOUNTER
Patients wife left message that they have decided to not have the surgery and to go with the other plan to have CT scan. I have canceled the surgery.

## 2018-07-25 ENCOUNTER — HOSPITAL ENCOUNTER (OUTPATIENT)
Dept: CT IMAGING | Age: 43
Discharge: HOME | End: 2018-07-25
Attending: PHYSICIAN ASSISTANT
Payer: COMMERCIAL

## 2018-07-25 DIAGNOSIS — I83.013 VENOUS STASIS ULCER OF RIGHT ANKLE LIMITED TO BREAKDOWN OF SKIN WITH VARICOSE VEINS (HCC): ICD-10-CM

## 2018-07-25 DIAGNOSIS — I87.2 VENOUS INSUFFICIENCY OF BOTH LOWER EXTREMITIES: ICD-10-CM

## 2018-07-25 DIAGNOSIS — L97.311 VENOUS STASIS ULCER OF RIGHT ANKLE LIMITED TO BREAKDOWN OF SKIN WITH VARICOSE VEINS (HCC): ICD-10-CM

## 2018-07-25 PROCEDURE — 82565 ASSAY OF CREATININE: CPT

## 2018-07-25 PROCEDURE — 74174 CTA ABD&PLVS W/CONTRAST: CPT

## 2018-07-25 PROCEDURE — 74011636320 HC RX REV CODE- 636/320: Performed by: PHYSICIAN ASSISTANT

## 2018-07-25 RX ADMIN — IOPAMIDOL 100 ML: 755 INJECTION, SOLUTION INTRAVENOUS at 21:00

## 2018-07-26 LAB — CREAT UR-MCNC: 1.2 MG/DL (ref 0.6–1.3)

## 2018-08-10 ENCOUNTER — HOSPITAL ENCOUNTER (OUTPATIENT)
Dept: NON INVASIVE DIAGNOSTICS | Age: 43
Discharge: HOME | End: 2018-08-10
Attending: INTERNAL MEDICINE
Payer: COMMERCIAL

## 2018-08-10 VITALS
DIASTOLIC BLOOD PRESSURE: 90 MMHG | BODY MASS INDEX: 39.27 KG/M2 | SYSTOLIC BLOOD PRESSURE: 130 MMHG | HEIGHT: 74 IN | WEIGHT: 306 LBS

## 2018-08-10 DIAGNOSIS — R93.1 ABNORMAL NUCLEAR CARDIAC IMAGING TEST: ICD-10-CM

## 2018-08-10 DIAGNOSIS — R94.31 ABNORMAL EKG: ICD-10-CM

## 2018-08-10 LAB
ECHO AO ROOT DIAM: 4.25 CM
ECHO IVC SNIFF: 2.43 CM
ECHO LA VOL BP: 79.71 ML (ref 18–58)
ECHO LA VOL/BSA BIPLANE: 30.62 ML/M2
ECHO LV INTERNAL DIMENSION DIASTOLIC: 5.48 CM (ref 4.2–5.9)
ECHO LV INTERNAL DIMENSION SYSTOLIC: 2.82 CM
ECHO LV IVSD: 1.18 CM (ref 0.6–1)
ECHO LV MASS 2D: 332.4 G (ref 88–224)
ECHO LV MASS INDEX 2D: 127.7 G/M2
ECHO LV POSTERIOR WALL DIASTOLIC: 1.26 CM (ref 0.6–1)
ECHO LVOT DIAM: 2.45 CM
ECHO LVOT PEAK GRADIENT: 3.4 MMHG
ECHO LVOT PEAK VELOCITY: 91.65 CM/S
ECHO LVOT VTI: 20.96 CM
ECHO MV A VELOCITY: 73.3 CM/S
ECHO MV E DECELERATION TIME (DT): 167.9 MS
ECHO MV E VELOCITY: 1.05 CM/S
ECHO MV E/A RATIO: 0.01
ECHO TV REGURGITANT MAX VELOCITY: 244.16 CM/S
ECHO TV REGURGITANT PEAK GRADIENT: 23.8 MMHG

## 2018-08-10 PROCEDURE — C8929 TTE W OR WO FOL WCON,DOPPLER: HCPCS

## 2018-08-10 PROCEDURE — 74011250636 HC RX REV CODE- 250/636: Performed by: INTERNAL MEDICINE

## 2018-08-10 RX ADMIN — PERFLUTREN 2 ML: 6.52 INJECTION, SUSPENSION INTRAVENOUS at 09:00

## 2018-08-13 ENCOUNTER — TELEPHONE (OUTPATIENT)
Dept: CARDIOLOGY CLINIC | Age: 43
End: 2018-08-13

## 2018-08-13 NOTE — TELEPHONE ENCOUNTER
----- Message from Valeria Chaudhari MD sent at 8/13/2018 11:14 AM EDT -----  Please let the patient know that his overall heart function was lower limits of normal on his echocardiogram.  EF 50%. No further workup needed. ----- Message -----     From: Elda Daley LPN     Sent: 8/51/1563   8:43 AM       To: Valeria Chaudhari MD    Per your last note\" Abnormal nuclear stress test.  Patient stress test was abnormal primarily due to a mildly depressed LV systolic function, EF 20%. However with his body habitus, this can also be due to an inaccurate calculation due to poor delineation of the myocardial borders. He did not have any obvious perfusion abnormalities concerning for ischemia or prior infarct.   I have recommended an echocardiogram to more accurately define his LV function.

## 2018-08-13 NOTE — LETTER
8/13/2018 3:07 PM 
 
Mr. Lay Alatorre [address] [place] [address] Dear Mr. Sofia Pelayo, We have been unable to reach you by phone to notify you of your test results. Please call our office at [phone] and ask to speak with my nurse in order to explain these results to you and advise you of any recommendations. Sincerely, Aundrea Crawford MD

## 2018-08-13 NOTE — PROGRESS NOTES
Per your last note\" Abnormal nuclear stress test.  Patient stress test was abnormal primarily due to a mildly depressed LV systolic function, EF 71%. However with his body habitus, this can also be due to an inaccurate calculation due to poor delineation of the myocardial borders. He did not have any obvious perfusion abnormalities concerning for ischemia or prior infarct. I have recommended an echocardiogram to more accurately define his LV function.

## 2018-08-14 ENCOUNTER — TELEPHONE (OUTPATIENT)
Dept: CARDIOLOGY CLINIC | Age: 43
End: 2018-08-14

## 2018-08-14 NOTE — TELEPHONE ENCOUNTER
Patient's wife called today request to sched pt follow up w/ Dr. Apoorva Kumar to go over results of echo. ... Per Dr. Turner Last note- \"----- Message from Jamari Mccann MD sent at 8/13/2018 11:14 AM EDT -----  Please let the patient know that his overall heart function was lower limits of normal on his echocardiogram.  EF 50%. No further workup needed. \"    I made pts wife aware Dr. Turner Last nurse had tried to reach them yesterday to give results of echo. .. Made pts wife aware of Dr. Johnny Adkins note. .. Pts wife sttanorris pt had reviewed results on his mychart and said it was abnormal.    I discussed with Dr. Apoorva Kumar again. .. Adilia Vivar   Verbal order and read back per Jamari Mccann MD   Have patient call the office or get good time to call the patient and I will discuss results of echo with the patient    Made patient wife aware. .. She states she will have patient call our office back today at around 3-3:30 to talk to Dr. Apoorva Kumar about echo.   Adilia Vivar

## 2018-09-05 ENCOUNTER — OFFICE VISIT (OUTPATIENT)
Dept: VASCULAR SURGERY | Age: 43
End: 2018-09-05

## 2018-09-05 VITALS
HEIGHT: 74 IN | HEART RATE: 80 BPM | BODY MASS INDEX: 39.27 KG/M2 | WEIGHT: 306 LBS | DIASTOLIC BLOOD PRESSURE: 84 MMHG | SYSTOLIC BLOOD PRESSURE: 142 MMHG

## 2018-09-05 DIAGNOSIS — I83.013 VENOUS STASIS ULCER OF RIGHT ANKLE LIMITED TO BREAKDOWN OF SKIN WITH VARICOSE VEINS (HCC): ICD-10-CM

## 2018-09-05 DIAGNOSIS — E66.01 OBESITY, MORBID (HCC): ICD-10-CM

## 2018-09-05 DIAGNOSIS — I87.2 VENOUS INSUFFICIENCY OF BOTH LOWER EXTREMITIES: ICD-10-CM

## 2018-09-05 DIAGNOSIS — L97.311 VENOUS STASIS ULCER OF RIGHT ANKLE LIMITED TO BREAKDOWN OF SKIN WITH VARICOSE VEINS (HCC): ICD-10-CM

## 2018-09-05 DIAGNOSIS — I87.303 CHRONIC VENOUS HYPERTENSION INVOLVING BOTH SIDES: ICD-10-CM

## 2018-09-05 NOTE — PROGRESS NOTES
1. Have you been to an emergency room or urgent care clinic since your last visit? No  Hospitalized since your last visit? If yes, where, when, and reason for visit? No  2. Have you seen or consulted any other health care providers outside of the [facility] since your last visit including any procedures, health maintenance items. If yes, where, when and reason for visit?

## 2018-09-05 NOTE — PROGRESS NOTES
Wliliam Yoder    Chief Complaint   Patient presents with   [facility] Varicose Veins       History and Physical    William Yoder is a 41 y.o. male with continued class VI chronic venous insufficiency of the right lower extremity and 5 on the left. He states that the wound on his right medial malleolus has stalled in its healing and he has difficulty with wearing his compression socks. He also has difficulty with performing exercise with right knee osteoarthritis as well as ligamentous tears. He does bike ride but has difficulty with walking or running. He also refuses to wear nonelastic compressive therapy as well as his compression stockings. Currently no fevers or chills. Past Medical History:   Diagnosis Date    Psychiatric disorder     ADHD    Varicose veins      History reviewed. No pertinent surgical history. Patient Active Problem List   Diagnosis Code    Hypogonadism male E29.1    Erectile dysfunction N52.9    Obesity, morbid ([facility] [address] E66.01    Venous insufficiency of both lower extremities I87.2    Chronic venous hypertension involving both sides I87.303    Venous stasis ulcer of right ankle limited to breakdown of skin with varicose veins ([facility]) I83.013, L97.311     Current Outpatient Prescriptions   Medication Sig Dispense Refill    sildenafil, antihypertensive, (REVATIO) 20 mg tablet Take 20 mg by mouth three (3) times daily.  amphetamine-dextroamphetamine XR (ADDERALL XR) 30 mg XR capsule Take 30 mg by mouth every morning.  sildenafil citrate (VIAGRA) 25 mg tablet Take 25 mg by mouth See Admin Instructions.  dextroamphetamine-amphetamine (ADDERALL) 20 mg tablet Take 20 mg by mouth three (3) times daily.  aspirin (ASPIRIN) 325 mg tablet Take 325 mg by mouth daily.  ibuprofen (MOTRIN) 600 mg tablet Take 1 Tab by mouth every six (6) hours as needed for Pain.  30 Tab 0    testosterone cypionate (DEPOTESTOTERONE CYPIONATE) 200 mg/mL injection 1 mL by IntraMUSCular route once for 1 dose. Every 2 weeks 2 mL 0    testosterone cypionate (DEPO-TESTOSTERONE) 200 mg/mL injection 1 mL by IntraMUSCular route once for 1 dose. Every 2 weeks 10 mL 0     No Known Allergies  Social History     Social History    Marital status:      Spouse name: N/A    Number of children: N/A    Years of education: N/A     Occupational History    Not on file. Social History Main Topics    Smoking status: Never Smoker    Smokeless tobacco: Never Used    Alcohol use No    Drug use: No    Sexual activity: Yes     Other Topics Concern    Not on file     Social History Narrative      Family History   Problem Relation Age of Onset    Diabetes Maternal Grandmother     Cancer Other        Physical Exam:    Visit Vitals    /84 (BP 1 Location: Left arm, BP Patient Position: Sitting)    Pulse 80    Ht 6' 2\" (1.88 m)    Wt 306 lb (138.8 kg)    BMI 39.29 kg/m2      General: Well-appearing male in no acute distress  HEENT: EOMI no scleral icterus is noted  Pulmonary: No increased work of breathing is noted  Extremities: Warm and perfused bilaterally on the right medial malleolus he has a Band-Aid that is present but he does have a picture on his phone showing a small circular venous stasis ulceration involving the skin and subcutaneous tissue with 100% pink granulation tissue no cellulitis is identified  Neuro: Cranial nerves II through XII are grossly intact    Impression and Plan:  Michael Davis is a 41 y.o. male with class VI chronic venous insufficiency of the right lower extremity with an ulceration over the medial malleolus class V chronic venous insufficiency of the left lower extremity. I reviewed his CT scan in clinic today showing no major venous abnormality but he does have numerous varicosities that are identified in the groin bilaterally.   At this current time there is very little surgical management of his disease process performing mechanical phlebectomy could be of some benefit to his lower extremities at preventing further ulcerations but given the fact that the patient refuses any medical therapy is very difficult to move forward with any surgical procedure. I have also recommended moving forward nonelastic compressive therapy to get his wound to heal again he has declined this. Patient states that he is thinking about performing FMLA for a few days to get off of his feet. I have recommended not doing that and to performing FMLA in order to get his wound to be completely healed and that may take 1-2 months. I have recommended nonelastic compressive therapy with aggressive wound care to get the wound to heal he would like to take some time to discuss with his partners about taking FMLA. We will see him back in 1 month's time. Hopefully he will perform some kind of compressive therapy to his lower extremity. I will see him back next month. We reviewed the plan with the patient and the patient understands. We also gave the patient appropriate instructions on their disease process and when to call back. Follow-up Disposition:  Return in about 4 weeks (around 10/3/2018). Jessica Davalos MD    PLEASE NOTE:  This document has been produced using voice recognition software. Unrecognized errors in transcription may be present.

## 2018-09-05 NOTE — MR AVS SNAPSHOT
Cash Leavitt 
 
 
 [address], [place] [address] [facility] [address] 
[phone] Patient: Nain Cheek MRN: [id number] Visit Information Date & Time Provider Department Dept. Phone Encounter #  
 9/5/2018  1:00 PM [facility] [id number] Follow-up Instructions Return in about 4 weeks (around 10/3/2018). Your Appointments 10/8/2018  9:30 AM  
Follow Up with Anna Larson MD  
[address] [facility] Appt Note: 1 mo f/u; pt r/s  
 [address], [place] [address] [facility] [address]  
[phone] [address] [place] [address], [place] [address] [facility] [address] Upcoming Health Maintenance Date Due Influenza Age 5 to Adult 8/1/2018 DTaP/Tdap/Td series (2 - Td) 4/28/2028 Allergies as of 9/5/2018  Review Complete On: 9/5/2018 By: Anna Larson MD  
 No Known Allergies Current Immunizations  Never Reviewed Name Date Tdap 4/28/2018  8:47 PM  
  
 Not reviewed this visit You Were Diagnosed With   
  
 Codes Comments Chronic venous hypertension involving both sides     ICD-10-CM: I87.303 ICD-9-CM: 459.30 Venous stasis ulcer of right ankle limited to breakdown of skin with varicose veins (HCC)     ICD-10-CM: I83.013, L97.311 ICD-9-CM: 454.0 Venous insufficiency of both lower extremities     ICD-10-CM: I87.2 ICD-9-CM: 459.81 Obesity, morbid ([facility] [address]     ICD-10-CM: E66.01 
ICD-9-CM: 278.01 Vitals BP Pulse Height(growth percentile) Weight(growth percentile) BMI Smoking Status 142/84 (BP 1 Location: Left arm, BP Patient Position: Sitting) 80 6' 2\" (1.88 m) 306 lb (138.8 kg) 39.29 kg/m2 Never Smoker Vitals History BMI and BSA Data Body Mass Index Body Surface Area  
 39.29 kg/m 2 2.69 m 2 Preferred Pharmacy Pharmacy Name Phone [address]  [phone] Your Updated Medication List  
  
   
This list is accurate as of 9/5/18  1:38 PM.  Always use your most recent med list.  
  
  
  
  
 * ADDERALL 20 mg tablet Generic drug:  dextroamphetamine-amphetamine Take 20 mg by mouth three (3) times daily. * ADDERALL XR 30 mg XR capsule Generic drug:  amphetamine-dextroamphetamine XR Take 30 mg by mouth every morning. aspirin 325 mg tablet Commonly known as:  ASPIRIN Take 325 mg by mouth daily. ibuprofen 600 mg tablet Commonly known as:  MOTRIN Take 1 Tab by mouth every six (6) hours as needed for Pain. * testosterone cypionate 200 mg/mL injection Commonly known as:  DEPO-TESTOSTERONE  
1 mL by IntraMUSCular route once for 1 dose. Every 2 weeks * testosterone cypionate 200 mg/mL injection Commonly known as:  DEPOTESTOTERONE CYPIONATE 1 mL by IntraMUSCular route once for 1 dose. Every 2 weeks * VIAGRA 25 mg tablet Generic drug:  sildenafil citrate Take 25 mg by mouth See Admin Instructions. * REVATIO 20 mg tablet Generic drug:  sildenafil (antihypertensive) Take 20 mg by mouth three (3) times daily. * Notice: This list has 6 medication(s) that are the same as other medications prescribed for you. Read the directions carefully, and ask your doctor or other care provider to review them with you. Follow-up Instructions Return in about 4 weeks (around 10/3/2018). Introducing [facility]! Dear Kadeem Sharp: Thank you for requesting a [employer] account. Our records indicate that you already have an active [employer] account. You can access your account anytime at [email or web address] Did you know that you can access your hospital and ER discharge instructions at any time in [employer]?   You can also review all of your test results from your hospital stay or ER visit. Additional Information If you have questions, please visit the Frequently Asked Questions section of the [facility] website at [email or web address] Remember, [facility] is NOT to be used for urgent needs. For medical emergencies, dial 911. Now available from your iPhone and Android! Please provide this summary of care documentation to your next provider. Your primary care clinician is listed as Phill Tan. If you have any questions after today's visit, please call [phone].